# Patient Record
(demographics unavailable — no encounter records)

---

## 2024-11-13 NOTE — ASSESSMENT
[FreeTextEntry1] : Risks, benefits, and alternatives of vivaer and turbinate reduction were explained including but not limited to bleeding, infection, persistent symptoms, numbness, perforation, change in smell, change in taste, need for additional surgery, etc...

## 2024-11-13 NOTE — HISTORY OF PRESENT ILLNESS
[FreeTextEntry1] : Patient returns today for chronic nasal congestion, sinusitis .  Patient states he is breathing better and sleeps very good using the Breath rite strips . During the day pt has congestion and obstruction and medication have not been able to help with this.

## 2024-11-13 NOTE — PHYSICAL EXAM
[de-identified] : obstructing cerumen right  [de-identified] : nasal valve collapse, improvement with Swain maneuver and lateralization [de-identified] : edema  [Normal] : mucosa is normal [Midline] : trachea located in midline position

## 2024-11-13 NOTE — REASON FOR VISIT
[Subsequent Evaluation] : a subsequent evaluation for [FreeTextEntry2] : chronic nasal congestion, sinusitis

## 2024-11-13 NOTE — PROCEDURE
[None] : none [Flexible Endoscope] : examined with the flexible endoscope [Congested] : congested [Floyd] : floyd [FreeTextEntry6] : The following anatomic sites were directly examined in a sequential fashion: The scope was introduced in the nasal passage between the middle and inferior turbinates to exam the inferior portion of the middle meatus and the fontanelle, as well as the maxillary ostia. Next, the scope was passed medically and posteriorly to the middle turbinates to examine the sphenoethmoid recess and the superior turbinate region. turbinate hypertrophy, swell bodies  [de-identified] : obstruction

## 2025-01-22 NOTE — HISTORY OF PRESENT ILLNESS
[FreeTextEntry1] : Patient here s/p [facility] 01/02/25. Has been having nasal pain, has been doing saline rinses. Feel like breathing is slightly improved. Has been having less pain 2 days ago.

## 2025-01-22 NOTE — PROCEDURE
[Rigid Endoscope] : examined with a rigid endoscope [Red] : red [FreeTextEntry6] : The following anatomic sites were directly examined in a sequential fashion: The scope was introduced in the nasal passage between the middle and inferior turbinates to exam the inferior portion of the middle meatus and the fontanelle, as well as the maxillary ostia. Next, the scope was passed medically and posteriorly to the middle turbinates to examine the sphenoethmoid recess and the superior turbinate region.

## 2025-01-22 NOTE — PHYSICAL EXAM
[Normal] : mucosa is normal [Midline] : trachea located in midline position [de-identified] : obstructing cerumen right  [de-identified] : edema

## 2025-01-22 NOTE — REASON FOR VISIT
[Subsequent Evaluation] : a subsequent evaluation for [FreeTextEntry2] : Chronic nasal congestion and nasal valve collapse